# Patient Record
(demographics unavailable — no encounter records)

---

## 2025-07-15 NOTE — HISTORY OF PRESENT ILLNESS
[FreeTextEntry1] : KELSEA PEARSON is a 18 year w female complaining of the appearance of her nose. she desires nasal cosmetic surgery. The risks, benefits, alternatives, limitations, and the permanent scars were outlined with the patient for a cosmetic open rhinoplasty. We will address the dorsal bump, the reduction of nasal width, the nasal tip reduction and sculpting, and the nasolabial angle and make adjustments based on realistic desires and principles for rhinoplasty.  Mirror Image morphed images were met with enthusiasm.  All questions were answered.  This patient is an acceptable candidate for the proposed surgery

## 2025-07-15 NOTE — ASSESSMENT
[FreeTextEntry1] : KELSEA  is a candidate for open cosmetic rhinoplasty. We will schedule surgery under general anesthesia for outpatient cosmetic open rhinoplasty at her his or her convenience and schedule. All of KELSEA 's questions and concerns were addressed and answered completely The instructions were reviewed in detail with KELSEA.  The risks, benefits, alternatives, limitations and the permanent scars were outlined with the patient. Discussion of risks included but not limited to bleeding, infection, delayed healing, and anesthetic risk. KELSEA leaving for college in [facility] in fall